# Patient Record
Sex: FEMALE | Race: WHITE | Employment: FULL TIME | URBAN - METROPOLITAN AREA
[De-identification: names, ages, dates, MRNs, and addresses within clinical notes are randomized per-mention and may not be internally consistent; named-entity substitution may affect disease eponyms.]

---

## 2023-10-23 ENCOUNTER — OFFICE VISIT (OUTPATIENT)
Dept: FAMILY MEDICINE CLINIC | Facility: CLINIC | Age: 48
End: 2023-10-23
Payer: COMMERCIAL

## 2023-10-23 VITALS
DIASTOLIC BLOOD PRESSURE: 98 MMHG | BODY MASS INDEX: 50.51 KG/M2 | RESPIRATION RATE: 16 BRPM | SYSTOLIC BLOOD PRESSURE: 142 MMHG | OXYGEN SATURATION: 96 % | HEART RATE: 89 BPM | TEMPERATURE: 97 F | HEIGHT: 62 IN | WEIGHT: 274.5 LBS

## 2023-10-23 DIAGNOSIS — R73.03 PREDIABETES: ICD-10-CM

## 2023-10-23 DIAGNOSIS — E66.01 CLASS 3 SEVERE OBESITY DUE TO EXCESS CALORIES WITHOUT SERIOUS COMORBIDITY WITH BODY MASS INDEX (BMI) OF 50.0 TO 59.9 IN ADULT (HCC): ICD-10-CM

## 2023-10-23 DIAGNOSIS — J40 BRONCHITIS: Primary | ICD-10-CM

## 2023-10-23 DIAGNOSIS — M25.561 ACUTE PAIN OF RIGHT KNEE: ICD-10-CM

## 2023-10-23 DIAGNOSIS — I10 PRIMARY HYPERTENSION: ICD-10-CM

## 2023-10-23 PROCEDURE — 3077F SYST BP >= 140 MM HG: CPT | Performed by: STUDENT IN AN ORGANIZED HEALTH CARE EDUCATION/TRAINING PROGRAM

## 2023-10-23 PROCEDURE — 3080F DIAST BP >= 90 MM HG: CPT | Performed by: STUDENT IN AN ORGANIZED HEALTH CARE EDUCATION/TRAINING PROGRAM

## 2023-10-23 PROCEDURE — 99204 OFFICE O/P NEW MOD 45 MIN: CPT | Performed by: STUDENT IN AN ORGANIZED HEALTH CARE EDUCATION/TRAINING PROGRAM

## 2023-10-23 PROCEDURE — 3008F BODY MASS INDEX DOCD: CPT | Performed by: STUDENT IN AN ORGANIZED HEALTH CARE EDUCATION/TRAINING PROGRAM

## 2023-10-23 RX ORDER — PREDNISONE 20 MG/1
TABLET ORAL
COMMUNITY
Start: 2023-10-19

## 2023-10-23 RX ORDER — PHENTERMINE HYDROCHLORIDE 15 MG/1
15 CAPSULE ORAL EVERY MORNING
Qty: 30 CAPSULE | Refills: 0 | Status: SHIPPED | OUTPATIENT
Start: 2023-11-21 | End: 2023-12-21

## 2023-10-23 RX ORDER — BENZONATATE 200 MG/1
CAPSULE ORAL
COMMUNITY
Start: 2023-10-19

## 2023-10-23 RX ORDER — AMLODIPINE BESYLATE 5 MG/1
5 TABLET ORAL 2 TIMES DAILY
Qty: 180 TABLET | Refills: 1 | Status: SHIPPED | OUTPATIENT
Start: 2023-10-23

## 2023-10-23 RX ORDER — ALBUTEROL SULFATE 90 UG/1
2 AEROSOL, METERED RESPIRATORY (INHALATION)
COMMUNITY
Start: 2023-10-19

## 2023-10-23 RX ORDER — AMLODIPINE BESYLATE 5 MG/1
5 TABLET ORAL 2 TIMES DAILY
COMMUNITY
End: 2023-10-23

## 2023-10-23 RX ORDER — PHENTERMINE HYDROCHLORIDE 15 MG/1
15 CAPSULE ORAL EVERY MORNING
Qty: 30 CAPSULE | Refills: 0 | Status: SHIPPED | OUTPATIENT
Start: 2023-10-23 | End: 2023-11-22

## 2023-10-23 RX ORDER — DICLOFENAC SODIUM 75 MG/1
75 TABLET, DELAYED RELEASE ORAL 2 TIMES DAILY PRN
Qty: 30 TABLET | Refills: 0 | Status: SHIPPED | OUTPATIENT
Start: 2023-10-23 | End: 2023-10-28

## 2023-12-04 DIAGNOSIS — M25.561 ACUTE PAIN OF RIGHT KNEE: ICD-10-CM

## 2023-12-04 RX ORDER — DICLOFENAC SODIUM 75 MG/1
75 TABLET, DELAYED RELEASE ORAL 2 TIMES DAILY PRN
Qty: 30 TABLET | Refills: 0 | Status: SHIPPED | OUTPATIENT
Start: 2023-12-04

## 2023-12-10 LAB
ALBUMIN/GLOBULIN RATIO: 1.7 (CALC) (ref 1–2.5)
ALBUMIN: 4 G/DL (ref 3.6–5.1)
ALKALINE PHOSPHATASE: 96 U/L (ref 37–153)
ALT: 64 U/L (ref 6–29)
AST: 43 U/L (ref 10–35)
BILIRUBIN, TOTAL: 0.6 MG/DL (ref 0.2–1.2)
BUN: 13 MG/DL (ref 7–25)
CALCIUM: 9.1 MG/DL (ref 8.6–10.4)
CARBON DIOXIDE: 26 MMOL/L (ref 20–32)
CHLORIDE: 105 MMOL/L (ref 98–110)
CHOL/HDLC RATIO: 4.1 (CALC)
CHOLESTEROL, TOTAL: 198 MG/DL
CREATININE: 0.85 MG/DL (ref 0.5–1.03)
EGFR: 83 ML/MIN/1.73M2
GLOBULIN: 2.3 G/DL (CALC) (ref 1.9–3.7)
GLUCOSE: 126 MG/DL (ref 65–99)
HDL CHOLESTEROL: 48 MG/DL
HEMATOCRIT: 40 % (ref 35–45)
HEMOGLOBIN A1C: 5.9 % OF TOTAL HGB
HEMOGLOBIN: 13.3 G/DL (ref 11.7–15.5)
LDL-CHOLESTEROL: 127 MG/DL (CALC)
MCH: 29.8 PG (ref 27–33)
MCHC: 33.3 G/DL (ref 32–36)
MCV: 89.5 FL (ref 80–100)
MPV: 10.8 FL (ref 7.5–12.5)
NON-HDL CHOLESTEROL: 150 MG/DL (CALC)
PLATELET COUNT: 262 THOUSAND/UL (ref 140–400)
POTASSIUM: 3.8 MMOL/L (ref 3.5–5.3)
PROTEIN, TOTAL: 6.3 G/DL (ref 6.1–8.1)
RDW: 13.1 % (ref 11–15)
RED BLOOD CELL COUNT: 4.47 MILLION/UL (ref 3.8–5.1)
SODIUM: 141 MMOL/L (ref 135–146)
TRIGLYCERIDES: 120 MG/DL
TSH W/REFLEX TO FT4: 1.99 MIU/L
WHITE BLOOD CELL COUNT: 6.5 THOUSAND/UL (ref 3.8–10.8)

## 2023-12-19 ENCOUNTER — OFFICE VISIT (OUTPATIENT)
Dept: FAMILY MEDICINE CLINIC | Facility: CLINIC | Age: 48
End: 2023-12-19
Payer: COMMERCIAL

## 2023-12-19 ENCOUNTER — TELEPHONE (OUTPATIENT)
Dept: FAMILY MEDICINE CLINIC | Facility: CLINIC | Age: 48
End: 2023-12-19

## 2023-12-19 VITALS
BODY MASS INDEX: 48.95 KG/M2 | RESPIRATION RATE: 16 BRPM | WEIGHT: 266 LBS | DIASTOLIC BLOOD PRESSURE: 80 MMHG | SYSTOLIC BLOOD PRESSURE: 126 MMHG | TEMPERATURE: 97 F | HEART RATE: 76 BPM | HEIGHT: 62 IN

## 2023-12-19 DIAGNOSIS — E66.01 CLASS 3 SEVERE OBESITY DUE TO EXCESS CALORIES WITHOUT SERIOUS COMORBIDITY WITH BODY MASS INDEX (BMI) OF 50.0 TO 59.9 IN ADULT (HCC): Primary | ICD-10-CM

## 2023-12-19 DIAGNOSIS — I10 PRIMARY HYPERTENSION: ICD-10-CM

## 2023-12-19 DIAGNOSIS — Z12.31 SCREENING MAMMOGRAM, ENCOUNTER FOR: ICD-10-CM

## 2023-12-19 DIAGNOSIS — R73.03 PREDIABETES: ICD-10-CM

## 2023-12-19 DIAGNOSIS — Z28.21 INFLUENZA VACCINATION DECLINED BY PATIENT: ICD-10-CM

## 2023-12-19 DIAGNOSIS — E78.00 PURE HYPERCHOLESTEROLEMIA: ICD-10-CM

## 2023-12-19 PROCEDURE — 3074F SYST BP LT 130 MM HG: CPT | Performed by: STUDENT IN AN ORGANIZED HEALTH CARE EDUCATION/TRAINING PROGRAM

## 2023-12-19 PROCEDURE — 3008F BODY MASS INDEX DOCD: CPT | Performed by: STUDENT IN AN ORGANIZED HEALTH CARE EDUCATION/TRAINING PROGRAM

## 2023-12-19 PROCEDURE — 3079F DIAST BP 80-89 MM HG: CPT | Performed by: STUDENT IN AN ORGANIZED HEALTH CARE EDUCATION/TRAINING PROGRAM

## 2023-12-19 PROCEDURE — 99214 OFFICE O/P EST MOD 30 MIN: CPT | Performed by: STUDENT IN AN ORGANIZED HEALTH CARE EDUCATION/TRAINING PROGRAM

## 2023-12-19 RX ORDER — PHENTERMINE HYDROCHLORIDE 30 MG/1
30 CAPSULE ORAL EVERY MORNING
Qty: 30 CAPSULE | Refills: 0 | Status: SHIPPED | OUTPATIENT
Start: 2023-12-19 | End: 2024-01-18

## 2023-12-19 RX ORDER — PHENTERMINE HYDROCHLORIDE 30 MG/1
30 CAPSULE ORAL EVERY MORNING
Qty: 30 CAPSULE | Refills: 0 | Status: SHIPPED | OUTPATIENT
Start: 2024-01-16 | End: 2024-02-15

## 2023-12-19 RX ORDER — PHENTERMINE HYDROCHLORIDE 15 MG/1
30 CAPSULE ORAL EVERY MORNING
Qty: 60 CAPSULE | Refills: 0 | Status: SHIPPED | OUTPATIENT
Start: 2023-12-19

## 2023-12-19 NOTE — TELEPHONE ENCOUNTER
Received fax from [facility] stating Phentermine 30mg is on backorder. They are asking if Phentermine 15mg 2QD can be given.    -please advise.  -pended if ok to sent.

## 2024-01-12 DIAGNOSIS — M25.561 ACUTE PAIN OF RIGHT KNEE: ICD-10-CM

## 2024-01-15 RX ORDER — DICLOFENAC SODIUM 75 MG/1
75 TABLET, DELAYED RELEASE ORAL 2 TIMES DAILY PRN
Qty: 30 TABLET | Refills: 0 | Status: SHIPPED | OUTPATIENT
Start: 2024-01-15

## 2024-01-15 NOTE — TELEPHONE ENCOUNTER
Requesting Diclofenac 75mg  Last OV: 12/19/23  RTC: 2 months  Last Rx'd 12/4/23 #30 with 0 refills    Future Appointments   Date Time Provider Department Center   1/23/2024  8:00 AM [facility]   2/19/2024 10:00 AM Tomasz Ren MD [facility] [address]       Non-protocol med:  Rx pended and routed for approval/denial

## 2024-01-22 DIAGNOSIS — E66.01 CLASS 3 SEVERE OBESITY DUE TO EXCESS CALORIES WITHOUT SERIOUS COMORBIDITY WITH BODY MASS INDEX (BMI) OF 50.0 TO 59.9 IN ADULT (HCC): ICD-10-CM

## 2024-01-22 DIAGNOSIS — R73.03 PREDIABETES: ICD-10-CM

## 2024-01-23 ENCOUNTER — HOSPITAL ENCOUNTER (OUTPATIENT)
Dept: MAMMOGRAPHY | Age: 49
Discharge: HOME | End: 2024-01-23
Attending: STUDENT IN AN ORGANIZED HEALTH CARE EDUCATION/TRAINING PROGRAM
Payer: COMMERCIAL

## 2024-01-23 DIAGNOSIS — Z12.31 SCREENING MAMMOGRAM, ENCOUNTER FOR: ICD-10-CM

## 2024-01-23 PROCEDURE — 77063 BREAST TOMOSYNTHESIS BI: CPT | Performed by: STUDENT IN AN ORGANIZED HEALTH CARE EDUCATION/TRAINING PROGRAM

## 2024-01-23 PROCEDURE — 77067 SCR MAMMO BI INCL CAD: CPT | Performed by: STUDENT IN AN ORGANIZED HEALTH CARE EDUCATION/TRAINING PROGRAM

## 2024-01-23 RX ORDER — PHENTERMINE HYDROCHLORIDE 15 MG/1
30 CAPSULE ORAL EVERY MORNING
Qty: 60 CAPSULE | Refills: 0 | OUTPATIENT
Start: 2024-01-23

## 2024-02-01 DIAGNOSIS — E66.01 CLASS 3 SEVERE OBESITY DUE TO EXCESS CALORIES WITHOUT SERIOUS COMORBIDITY WITH BODY MASS INDEX (BMI) OF 50.0 TO 59.9 IN ADULT (HCC): ICD-10-CM

## 2024-02-01 DIAGNOSIS — R73.03 PREDIABETES: ICD-10-CM

## 2024-02-02 RX ORDER — PHENTERMINE HYDROCHLORIDE 15 MG/1
30 CAPSULE ORAL EVERY MORNING
Qty: 60 CAPSULE | Refills: 0 | OUTPATIENT
Start: 2024-02-02

## 2024-02-02 NOTE — TELEPHONE ENCOUNTER
Requesting Phentermine 15mg   Last OV: 12/19/23  RTC: 2-3 months  Last Rx'd 12/19/23 #60 with 0 refills    Future Appointments   Date Time Provider Department Center   2/19/2024 10:00 AM Tomasz Ren MD [facility] [address]       Non-protocol med:  Rx pended and routed for approval/denial

## 2024-02-04 DIAGNOSIS — E66.01 CLASS 3 SEVERE OBESITY DUE TO EXCESS CALORIES WITHOUT SERIOUS COMORBIDITY WITH BODY MASS INDEX (BMI) OF 50.0 TO 59.9 IN ADULT (HCC): ICD-10-CM

## 2024-02-04 DIAGNOSIS — R73.03 PREDIABETES: ICD-10-CM

## 2024-02-05 RX ORDER — PHENTERMINE HYDROCHLORIDE 15 MG/1
30 CAPSULE ORAL EVERY MORNING
Qty: 60 CAPSULE | Refills: 0 | Status: SHIPPED | OUTPATIENT
Start: 2024-02-05

## 2024-02-06 RX ORDER — PHENTERMINE HYDROCHLORIDE 15 MG/1
30 CAPSULE ORAL EVERY MORNING
Qty: 60 CAPSULE | Refills: 0 | OUTPATIENT
Start: 2024-02-06

## 2024-02-19 ENCOUNTER — OFFICE VISIT (OUTPATIENT)
Dept: FAMILY MEDICINE CLINIC | Facility: CLINIC | Age: 49
End: 2024-02-19
Payer: COMMERCIAL

## 2024-02-19 ENCOUNTER — TELEPHONE (OUTPATIENT)
Dept: FAMILY MEDICINE CLINIC | Facility: CLINIC | Age: 49
End: 2024-02-19

## 2024-02-19 VITALS
HEIGHT: 62 IN | WEIGHT: 259.5 LBS | RESPIRATION RATE: 16 BRPM | BODY MASS INDEX: 47.75 KG/M2 | SYSTOLIC BLOOD PRESSURE: 128 MMHG | DIASTOLIC BLOOD PRESSURE: 78 MMHG | HEART RATE: 86 BPM | TEMPERATURE: 97 F | OXYGEN SATURATION: 98 %

## 2024-02-19 DIAGNOSIS — R73.03 PREDIABETES: ICD-10-CM

## 2024-02-19 DIAGNOSIS — Z13.31 NEGATIVE DEPRESSION SCREENING: ICD-10-CM

## 2024-02-19 DIAGNOSIS — E78.00 PURE HYPERCHOLESTEROLEMIA: ICD-10-CM

## 2024-02-19 DIAGNOSIS — E66.01 CLASS 3 SEVERE OBESITY DUE TO EXCESS CALORIES WITHOUT SERIOUS COMORBIDITY WITH BODY MASS INDEX (BMI) OF 50.0 TO 59.9 IN ADULT (HCC): ICD-10-CM

## 2024-02-19 DIAGNOSIS — D12.6 ADENOMATOUS POLYP OF COLON, UNSPECIFIED PART OF COLON: ICD-10-CM

## 2024-02-19 DIAGNOSIS — Z00.00 WELL ADULT EXAM: Primary | ICD-10-CM

## 2024-02-19 DIAGNOSIS — Z23 NEED FOR SHINGLES VACCINE: ICD-10-CM

## 2024-02-19 DIAGNOSIS — Z23 NEED FOR DIPHTHERIA-TETANUS-PERTUSSIS (TDAP) VACCINE: ICD-10-CM

## 2024-02-19 RX ORDER — PHENTERMINE HYDROCHLORIDE 37.5 MG/1
37.5 TABLET ORAL
Qty: 30 TABLET | Refills: 0 | Status: SHIPPED | OUTPATIENT
Start: 2024-05-03 | End: 2024-06-02

## 2024-02-19 RX ORDER — PHENTERMINE HYDROCHLORIDE 37.5 MG/1
37.5 TABLET ORAL
Qty: 30 TABLET | Refills: 0 | Status: SHIPPED | OUTPATIENT
Start: 2024-04-03 | End: 2024-05-03

## 2024-02-19 RX ORDER — PHENTERMINE HYDROCHLORIDE 37.5 MG/1
37.5 TABLET ORAL
Qty: 30 TABLET | Refills: 0 | Status: SHIPPED | OUTPATIENT
Start: 2024-03-04 | End: 2024-04-03

## 2024-02-19 NOTE — PROGRESS NOTES
Subjective:      Chief Complaint   Patient presents with    Weight Check     Taking phentermine 15mg     Physical     Has c-scope almost 2 years ago with Dr. Jang     HISTORY OF PRESENT ILLNESS  HPI  HPI obtained per patient report.  Johanny Lin is a pleasant 48 year old female presenting for her annual physical and for follow-up for her phentermine medication.   She has been tolerating phentermine 30mg well without any symptoms of side effects. She is down 7lbs since her LOV, but feels that she may be experiencing a plateau in her weight loss.    PAST PATIENT HISTORY  Past Medical History:   Diagnosis Date    Esophageal reflux     HTN (hypertension)     Obesity     Prediabetes      Past Surgical History:   Procedure Laterality Date          CHOLECYSTECTOMY  2022    COLONOSCOPY  2022      01       CURRENT MEDICATIONS  Outpatient Medications Marked as Taking for the 24 encounter (Office Visit) with Tomasz Ren MD   Medication Sig Dispense Refill    [START ON 5/3/2024] Phentermine HCl 37.5 MG Oral Tab Take 1 tablet (37.5 mg total) by mouth every morning before breakfast. 30 tablet 0    [START ON 4/3/2024] Phentermine HCl 37.5 MG Oral Tab Take 1 tablet (37.5 mg total) by mouth every morning before breakfast. 30 tablet 0    [START ON 3/4/2024] Phentermine HCl 37.5 MG Oral Tab Take 1 tablet (37.5 mg total) by mouth every morning before breakfast. 30 tablet 0    DICLOFENAC 75 MG Oral Tab EC TAKE ONE TABLET BY MOUTH TWICE DAILY AS NEEDED 30 tablet 0    amLODIPine 5 MG Oral Tab Take 1 tablet (5 mg total) by mouth 2 (two) times daily. 180 tablet 1    Omeprazole 40 MG Oral Capsule Delayed Release          HEALTH MAINTENANCE  Immunization History   Administered Date(s) Administered    >=3 YRS TRI  MULTIDOSE VIAL (87390) FLU CLINIC 2014    Covid-19 Vaccine Pfizer 30 mcg/0.3 ml 2021, 2021    FLU VAC QIV SPLIT 3 YRS AND OLDER (46063) 2016    Influenza  10/14/2011   Pended Date(s) Pended    TDAP 02/19/2024    Zoster Vaccine Recombinant Adjuvanted (Shingrix) 02/19/2024   Deferred Date(s) Deferred    FLUZONE 6 months and older PFS 0.5 ml (01724) 12/19/2023       ALLERGIES AND DRUG REACTIONS  Allergies   Allergen Reactions    Adhesive Tape     Neosporin [Neomycin-Bacitracin-Polymyxin]        Family History   Problem Relation Age of Onset    Breast Cancer Maternal Grandmother 60        DX in her 60's     Cancer Maternal Grandmother     Dementia Maternal Grandmother     Cancer Paternal Grandmother         Lung and bone    Breast Cancer Other         age at dx:  unknown    Breast Cancer Other         age at dx:   unknown    Depression Mother     Diabetes Mother     Hypertension Mother     Cancer Father     Hypertension Father     Obesity Father     Depression Son      Social History     Socioeconomic History    Marital status:    Tobacco Use    Smoking status: Never    Smokeless tobacco: Never   Substance and Sexual Activity    Alcohol use: No    Drug use: No   Other Topics Concern    Caffeine Concern Yes    Exercise No    Seat Belt Yes    Special Diet No    Stress Concern Yes    Weight Concern Yes       Review of Systems   All other systems reviewed and are negative.         Objective:      /78   Pulse 86   Temp 97.2 °F (36.2 °C) (Temporal)   Resp 16   Ht 5' 2\" (1.575 m)   Wt 259 lb 8 oz (117.7 kg)   SpO2 98%   BMI 47.46 kg/m²   Body mass index is 47.46 kg/m².    Physical Exam  Vitals reviewed.   Constitutional:       General: She is not in acute distress.     Appearance: She is not ill-appearing, toxic-appearing or diaphoretic.   HENT:      Head: Normocephalic and atraumatic.   Eyes:      General: No scleral icterus.        Right eye: No discharge.         Left eye: No discharge.      Extraocular Movements: Extraocular movements intact.      Conjunctiva/sclera: Conjunctivae normal.      Pupils: Pupils are equal, round, and reactive to light.    Cardiovascular:      Rate and Rhythm: Normal rate and regular rhythm.      Heart sounds: Normal heart sounds.   Pulmonary:      Effort: Pulmonary effort is normal.      Breath sounds: Normal breath sounds.   Abdominal:      General: Bowel sounds are normal. There is no distension.      Palpations: Abdomen is soft. There is no mass.      Tenderness: There is no abdominal tenderness. There is no guarding or rebound.   Musculoskeletal:      Cervical back: Neck supple.      Right lower leg: No edema.      Left lower leg: No edema.   Neurological:      Mental Status: She is alert and oriented to person, place, and time.            Assessment and Plan:      1. Well adult exam (Primary)  2. Negative depression screening  3. Adenomatous polyp of colon, unspecified part of colon  4. Need for diphtheria-tetanus-pertussis (Tdap) vaccine  -     TdaP (Boostrix) Vaccine (> 7 Y)  5. Need for shingles vaccine  -     Zoster Vac (Shingrix) in office vaccine- Non-Medicare or Medicare with ABN  6. Class 3 severe obesity due to excess calories without serious comorbidity with body mass index (BMI) of 50.0 to 59.9 in adult (HCC)  -     Phentermine HCl; Take 1 tablet (37.5 mg total) by mouth every morning before breakfast.  Dispense: 30 tablet; Refill: 0  -     Phentermine HCl; Take 1 tablet (37.5 mg total) by mouth every morning before breakfast.  Dispense: 30 tablet; Refill: 0  -     Phentermine HCl; Take 1 tablet (37.5 mg total) by mouth every morning before breakfast.  Dispense: 30 tablet; Refill: 0  7. Body mass index 50.0-59.9, adult (HCC)  -     Phentermine HCl; Take 1 tablet (37.5 mg total) by mouth every morning before breakfast.  Dispense: 30 tablet; Refill: 0  -     Phentermine HCl; Take 1 tablet (37.5 mg total) by mouth every morning before breakfast.  Dispense: 30 tablet; Refill: 0  -     Phentermine HCl; Take 1 tablet (37.5 mg total) by mouth every morning before breakfast.  Dispense: 30 tablet; Refill: 0  8.  Prediabetes  -     Phentermine HCl; Take 1 tablet (37.5 mg total) by mouth every morning before breakfast.  Dispense: 30 tablet; Refill: 0  -     Phentermine HCl; Take 1 tablet (37.5 mg total) by mouth every morning before breakfast.  Dispense: 30 tablet; Refill: 0  -     Phentermine HCl; Take 1 tablet (37.5 mg total) by mouth every morning before breakfast.  Dispense: 30 tablet; Refill: 0  9. Pure hypercholesterolemia  -     Phentermine HCl; Take 1 tablet (37.5 mg total) by mouth every morning before breakfast.  Dispense: 30 tablet; Refill: 0  -     Phentermine HCl; Take 1 tablet (37.5 mg total) by mouth every morning before breakfast.  Dispense: 30 tablet; Refill: 0  -     Phentermine HCl; Take 1 tablet (37.5 mg total) by mouth every morning before breakfast.  Dispense: 30 tablet; Refill: 0  10. Primary hypertension    Return in about 3 months (around 5/19/2024) for follow-up.    Physical  - recent mammogram benign; next due 1/2024  - pap deferred today per pt's request. Will reconsider pap at next OV  - colonoscopy next due 4/2025. History of adenomatous polyps on colonoscopy 2 years ago  - we discussed the R/B/A of Tdap booster and shingrix; pt consents to administration of both today. Will plan on 2nd dose of shingrix at next follow-up in about 3 months    BMI>50, prediabetes, hypercholesterolemia  - tolerating phentermine 30mg well  - a shared decision was made to try phentermine 37.5mg. We discussed the R/B/A of this dose adjustment  - we will plan to follow-up in 3-4 months or earlier if needed  - we will plan to recheck her CMP, A1C, and lipid panel in 4 months     Patient verbalized understanding of assessment and recommendations. All questions and concerns were addressed.    Electronically signed by Tomasz Ren MD

## 2024-02-19 NOTE — TELEPHONE ENCOUNTER
Received Colonoscopy report from Dr. Ezekiel Jang.  HM updated.  Report placed on MD bin for review.

## 2024-05-13 ENCOUNTER — OFFICE VISIT (OUTPATIENT)
Dept: FAMILY MEDICINE CLINIC | Facility: CLINIC | Age: 49
End: 2024-05-13
Payer: COMMERCIAL

## 2024-05-13 VITALS
BODY MASS INDEX: 44.76 KG/M2 | RESPIRATION RATE: 16 BRPM | HEIGHT: 62 IN | DIASTOLIC BLOOD PRESSURE: 84 MMHG | WEIGHT: 243.25 LBS | OXYGEN SATURATION: 97 % | HEART RATE: 85 BPM | SYSTOLIC BLOOD PRESSURE: 128 MMHG | TEMPERATURE: 97 F

## 2024-05-13 DIAGNOSIS — R73.03 PREDIABETES: ICD-10-CM

## 2024-05-13 DIAGNOSIS — N91.2 AMENORRHEA: ICD-10-CM

## 2024-05-13 DIAGNOSIS — Z12.4 CERVICAL CANCER SCREENING: ICD-10-CM

## 2024-05-13 DIAGNOSIS — E66.01 CLASS 3 SEVERE OBESITY DUE TO EXCESS CALORIES WITHOUT SERIOUS COMORBIDITY WITH BODY MASS INDEX (BMI) OF 50.0 TO 59.9 IN ADULT (HCC): Primary | ICD-10-CM

## 2024-05-13 DIAGNOSIS — E78.00 PURE HYPERCHOLESTEROLEMIA: ICD-10-CM

## 2024-05-13 DIAGNOSIS — Z23 NEED FOR VACCINATION: ICD-10-CM

## 2024-05-13 PROCEDURE — 87624 HPV HI-RISK TYP POOLED RSLT: CPT | Performed by: STUDENT IN AN ORGANIZED HEALTH CARE EDUCATION/TRAINING PROGRAM

## 2024-05-13 PROCEDURE — 88175 CYTOPATH C/V AUTO FLUID REDO: CPT | Performed by: STUDENT IN AN ORGANIZED HEALTH CARE EDUCATION/TRAINING PROGRAM

## 2024-05-13 PROCEDURE — 90750 HZV VACC RECOMBINANT IM: CPT | Performed by: STUDENT IN AN ORGANIZED HEALTH CARE EDUCATION/TRAINING PROGRAM

## 2024-05-13 PROCEDURE — 99214 OFFICE O/P EST MOD 30 MIN: CPT | Performed by: STUDENT IN AN ORGANIZED HEALTH CARE EDUCATION/TRAINING PROGRAM

## 2024-05-13 PROCEDURE — 90471 IMMUNIZATION ADMIN: CPT | Performed by: STUDENT IN AN ORGANIZED HEALTH CARE EDUCATION/TRAINING PROGRAM

## 2024-05-13 RX ORDER — PHENTERMINE HYDROCHLORIDE 37.5 MG/1
37.5 TABLET ORAL
Qty: 30 TABLET | Refills: 0 | Status: SHIPPED | OUTPATIENT
Start: 2024-05-13 | End: 2024-06-12

## 2024-05-13 RX ORDER — PHENTERMINE HYDROCHLORIDE 37.5 MG/1
37.5 TABLET ORAL
Qty: 30 TABLET | Refills: 0 | Status: SHIPPED | OUTPATIENT
Start: 2024-06-12 | End: 2024-07-12

## 2024-05-13 RX ORDER — PHENTERMINE HYDROCHLORIDE 37.5 MG/1
37.5 TABLET ORAL
Qty: 30 TABLET | Refills: 0 | Status: SHIPPED | OUTPATIENT
Start: 2024-07-12 | End: 2024-08-11

## 2024-05-13 NOTE — PROGRESS NOTES
Subjective:      Chief Complaint   Patient presents with    Weight Check     Taking phentermin 37.5mg    Pap    Immunization/Injection     Due for 2nd shingrix - pt agreed to vaccine today     HISTORY OF PRESENT ILLNESS  HPI  HPI obtained per patient report.  Johanny Lin is a pleasant 49 year old female presenting for follow-up and her pap.   She is also due for her second shingrix dose.     She has been feeling well with her current dose of phentermine. She joined a fitness club and has been exercising 3 times per week since March as well.     She notes that she had uterine bleeding in February and April of this year after over 1 year of amenorrhea. She did not have any hot flashes or other symptoms of menopause.     PAST PATIENT HISTORY  Past Medical History:    Esophageal reflux    HTN (hypertension)    Obesity    Prediabetes     Past Surgical History:   Procedure Laterality Date          Cholecystectomy  2022    Colonoscopy  2022      01       CURRENT MEDICATIONS  Outpatient Medications Marked as Taking for the 24 encounter (Office Visit) with Tomasz Ren MD   Medication Sig Dispense Refill    Phentermine HCl 37.5 MG Oral Tab Take 1 tablet (37.5 mg total) by mouth every morning before breakfast. 30 tablet 0    [START ON 2024] Phentermine HCl 37.5 MG Oral Tab Take 1 tablet (37.5 mg total) by mouth every morning before breakfast. 30 tablet 0    [START ON 2024] Phentermine HCl 37.5 MG Oral Tab Take 1 tablet (37.5 mg total) by mouth every morning before breakfast. 30 tablet 0    amLODIPine 5 MG Oral Tab Take 1 tablet (5 mg total) by mouth 2 (two) times daily. 180 tablet 1    Omeprazole 40 MG Oral Capsule Delayed Release          HEALTH MAINTENANCE  Immunization History   Administered Date(s) Administered    >=3 YRS TRI  MULTIDOSE VIAL (04378) FLU CLINIC 2014    Covid-19 Vaccine Pfizer 30 mcg/0.3 ml 2021, 2021    FLU VAC QIV SPLIT 3 YRS AND  OLDER (53737) 12/19/2016    Influenza 10/14/2011    TDAP 02/19/2024    Zoster Vaccine Recombinant Adjuvanted (Shingrix) 02/19/2024   Pended Date(s) Pended    Zoster Vaccine Recombinant Adjuvanted (Shingrix) 05/13/2024   Deferred Date(s) Deferred    FLUZONE 6 months and older PFS 0.5 ml (88181) 12/19/2023       ALLERGIES AND DRUG REACTIONS  Allergies   Allergen Reactions    Adhesive Tape     Neosporin [Neomycin-Bacitracin-Polymyxin]        Family History   Problem Relation Age of Onset    Breast Cancer Maternal Grandmother 60        DX in her 60's     Cancer Maternal Grandmother     Dementia Maternal Grandmother     Cancer Paternal Grandmother         Lung and bone    Breast Cancer Other         age at dx:  unknown    Breast Cancer Other         age at dx:   unknown    Depression Mother     Diabetes Mother     Hypertension Mother     Cancer Father     Hypertension Father     Obesity Father     Depression Son      Social History     Socioeconomic History    Marital status:    Tobacco Use    Smoking status: Never    Smokeless tobacco: Never   Substance and Sexual Activity    Alcohol use: No    Drug use: No   Other Topics Concern    Caffeine Concern Yes    Exercise No    Seat Belt Yes    Special Diet No    Stress Concern Yes    Weight Concern Yes       Review of Systems   Genitourinary:  Positive for vaginal bleeding.   All other systems reviewed and are negative.         Objective:      /84   Pulse 85   Temp 97.2 °F (36.2 °C) (Temporal)   Resp 16   Ht 5' 2\" (1.575 m)   Wt 243 lb 4 oz (110.3 kg)   SpO2 97%   BMI 44.49 kg/m²   Body mass index is 44.49 kg/m².    Physical Exam  Vitals reviewed.   Constitutional:       General: She is not in acute distress.     Appearance: She is not ill-appearing, toxic-appearing or diaphoretic.   HENT:      Head: Normocephalic and atraumatic.   Cardiovascular:      Rate and Rhythm: Normal rate.   Pulmonary:      Effort: Pulmonary effort is normal.   Abdominal:       General: There is no distension.   Genitourinary:     General: Normal vulva.      Vagina: Normal.      Cervix: Normal.   Musculoskeletal:      Cervical back: Neck supple.      Right lower leg: No edema.      Left lower leg: No edema.   Neurological:      Mental Status: She is alert and oriented to person, place, and time.   Psychiatric:         Mood and Affect: Mood normal.            Assessment and Plan:      1. Class 3 severe obesity due to excess calories without serious comorbidity with body mass index (BMI) of 50.0 to 59.9 in adult ([facility]) (Primary)  -     Comp Metabolic Panel (14)  -     Lipid Panel  -     Hemoglobin A1C  -     Phentermine HCl; Take 1 tablet (37.5 mg total) by mouth every morning before breakfast.  Dispense: 30 tablet; Refill: 0  -     Phentermine HCl; Take 1 tablet (37.5 mg total) by mouth every morning before breakfast.  Dispense: 30 tablet; Refill: 0  -     Phentermine HCl; Take 1 tablet (37.5 mg total) by mouth every morning before breakfast.  Dispense: 30 tablet; Refill: 0  2. Need for vaccination  -     Zoster Vac (Shingrix) in office vaccine- Non-Medicare or Medicare with ABN  3. Body mass index 50.0-59.9, adult ([facility])  -     Comp Metabolic Panel (14)  -     Lipid Panel  -     Hemoglobin A1C  -     Phentermine HCl; Take 1 tablet (37.5 mg total) by mouth every morning before breakfast.  Dispense: 30 tablet; Refill: 0  -     Phentermine HCl; Take 1 tablet (37.5 mg total) by mouth every morning before breakfast.  Dispense: 30 tablet; Refill: 0  -     Phentermine HCl; Take 1 tablet (37.5 mg total) by mouth every morning before breakfast.  Dispense: 30 tablet; Refill: 0  4. Prediabetes  -     Comp Metabolic Panel (14)  -     Lipid Panel  -     Hemoglobin A1C  -     Phentermine HCl; Take 1 tablet (37.5 mg total) by mouth every morning before breakfast.  Dispense: 30 tablet; Refill: 0  -     Phentermine HCl; Take 1 tablet (37.5 mg total) by mouth every morning before breakfast.  Dispense: 30  tablet; Refill: 0  -     Phentermine HCl; Take 1 tablet (37.5 mg total) by mouth every morning before breakfast.  Dispense: 30 tablet; Refill: 0  5. Pure hypercholesterolemia  -     Comp Metabolic Panel (14)  -     Lipid Panel  -     Hemoglobin A1C  -     Phentermine HCl; Take 1 tablet (37.5 mg total) by mouth every morning before breakfast.  Dispense: 30 tablet; Refill: 0  -     Phentermine HCl; Take 1 tablet (37.5 mg total) by mouth every morning before breakfast.  Dispense: 30 tablet; Refill: 0  -     Phentermine HCl; Take 1 tablet (37.5 mg total) by mouth every morning before breakfast.  Dispense: 30 tablet; Refill: 0  6. Cervical cancer screening  -     ThinPrep PAP Smear B; Future; Expected date: 05/13/2024  -     Hpv High Risk , Thin Prep Collect; Future; Expected date: 05/13/2024  7. Amenorrhea  -     FSH AND LH [7137] [Q]  -     Estradiol    Return in about 3 months (around 8/13/2024).    BMI>50, prediabetes, hypercholesterolemia  - tolerating phentermine 37.5 mg well  - she is down 16 lbs since her LOV about 3 months ago  - we will continue her phentermine regimen for another 3 months  - recommended rechecking her labs sometime within the next 3 months    Cervical cancer screening  - pap specimen sent for cotesting today with pt consent    Amenorrhea  - possible postmenopausal bleeding  - recommended checking hormone levels to evaluate menopausal status  - further recommendations pending these test results    Immunization  - 2nd dose of shingrix administered with her consent today    Patient verbalized understanding of assessment and recommendations. All questions and concerns were addressed.    Electronically signed by Tomasz Ren MD

## 2024-05-14 LAB — HPV I/H RISK 1 DNA SPEC QL NAA+PROBE: NEGATIVE

## 2024-05-19 DIAGNOSIS — M25.561 ACUTE PAIN OF RIGHT KNEE: ICD-10-CM

## 2024-05-20 LAB
.: NORMAL
.: NORMAL

## 2024-05-21 LAB
ALBUMIN/GLOBULIN RATIO: 1.8 (CALC) (ref 1–2.5)
ALBUMIN: 4.3 G/DL (ref 3.6–5.1)
ALKALINE PHOSPHATASE: 81 U/L (ref 37–153)
ALT: 55 U/L (ref 6–29)
AST: 40 U/L (ref 10–35)
BILIRUBIN, TOTAL: 0.7 MG/DL (ref 0.2–1.2)
BUN: 16 MG/DL (ref 7–25)
CALCIUM: 9.5 MG/DL (ref 8.6–10.4)
CARBON DIOXIDE: 31 MMOL/L (ref 20–32)
CHLORIDE: 104 MMOL/L (ref 98–110)
CHOL/HDLC RATIO: 3.4 (CALC)
CHOLESTEROL, TOTAL: 190 MG/DL
CREATININE: 0.82 MG/DL (ref 0.5–1.03)
EGFR: 87 ML/MIN/1.73M2
ESTRADIOL: 20 PG/ML
FSH: 71.9 MIU/ML
GLOBULIN: 2.4 G/DL (CALC) (ref 1.9–3.7)
GLUCOSE: 107 MG/DL (ref 65–99)
HDL CHOLESTEROL: 56 MG/DL
HEMOGLOBIN A1C: 5.7 % OF TOTAL HGB
LDL-CHOLESTEROL: 116 MG/DL (CALC)
LH: 34.9 MIU/ML
NON-HDL CHOLESTEROL: 134 MG/DL (CALC)
POTASSIUM: 3.8 MMOL/L (ref 3.5–5.3)
PROTEIN, TOTAL: 6.7 G/DL (ref 6.1–8.1)
SODIUM: 141 MMOL/L (ref 135–146)
TRIGLYCERIDES: 81 MG/DL

## 2024-05-21 RX ORDER — DICLOFENAC SODIUM 75 MG/1
75 TABLET, DELAYED RELEASE ORAL 2 TIMES DAILY PRN
Qty: 30 TABLET | Refills: 0 | OUTPATIENT
Start: 2024-05-21

## 2024-05-21 NOTE — TELEPHONE ENCOUNTER
Requested Renewals     Name from pharmacy: Diclofenac Sodium Dr 75 Mg Tab Risi         Will file in chart as: DICLOFENAC 75 MG Oral Tab EC    The original prescription was discontinued on 1/15/2024 by Tomasz Ren MD. Renewing this prescription may not be appropriate.    Sig: TAKE ONE TABLET BY MOUTH TWICE DAILY AS NEEDED    Disp: 30 tablet    Refills: 0    Start: 5/19/2024    Class: Normal    Non-formulary For: Acute pain of right knee        Future Appointments   Date Time Provider Department Center   8/15/2024  9:00 AM Tomasz Ren MD [facility] [address]     LOV: 5/13/24    This medication was discontinued.

## 2024-05-24 DIAGNOSIS — N95.0 POSTMENOPAUSAL BLEEDING: Primary | ICD-10-CM

## 2024-07-27 DIAGNOSIS — M25.561 ACUTE PAIN OF RIGHT KNEE: ICD-10-CM

## 2024-07-30 RX ORDER — DICLOFENAC SODIUM 75 MG/1
75 TABLET, DELAYED RELEASE ORAL 2 TIMES DAILY PRN
Qty: 30 TABLET | Refills: 0 | OUTPATIENT
Start: 2024-07-30

## 2024-07-30 NOTE — TELEPHONE ENCOUNTER
Requested Renewals     Name from pharmacy: Diclofenac Sodium Dr 75 Mg Tab Risi         Will file in chart as: DICLOFENAC 75 MG Oral Tab EC    The original prescription was discontinued on 5/13/2024 by Lidia Priest MA for the following reason: Patient discontinued. Renewing this prescription may not be appropriate.    Sig: TAKE ONE TABLET BY MOUTH TWICE DAILY AS NEEDED    Disp: 30 tablet    Refills: 0    Start: 7/27/2024    Class: Normal    Non-formulary For: Acute pain of right knee    Last ordered: 6 months ago (1/15/2024) by Tomasz Ren MD    Last refill: 1/15/2024    Rx #: [id number]    Non-Narcotic Pain Medication Protocol [id number]/27/2024 11:03 AM   Protocol Details In person appointment or virtual visit in the past 6 mos or appointment in next 3 mos             Future Appointments   Date Time Provider Department Center   8/15/2024  9:00 AM Tomasz Ren MD [facility] [address]     LOV: 5/13/24    This medication was discontinued.

## 2024-09-06 DIAGNOSIS — E78.00 PURE HYPERCHOLESTEROLEMIA: ICD-10-CM

## 2024-09-06 DIAGNOSIS — R73.03 PREDIABETES: ICD-10-CM

## 2024-09-06 DIAGNOSIS — E66.01 CLASS 3 SEVERE OBESITY DUE TO EXCESS CALORIES WITHOUT SERIOUS COMORBIDITY WITH BODY MASS INDEX (BMI) OF 50.0 TO 59.9 IN ADULT (HCC): ICD-10-CM

## 2024-09-06 DIAGNOSIS — I10 PRIMARY HYPERTENSION: ICD-10-CM

## 2024-09-11 RX ORDER — AMLODIPINE BESYLATE 5 MG/1
5 TABLET ORAL 2 TIMES DAILY
Qty: 180 TABLET | Refills: 0 | Status: SHIPPED | OUTPATIENT
Start: 2024-09-11

## 2024-09-11 NOTE — TELEPHONE ENCOUNTER
Requesting Phentermine 37.5mg  Last Rx'd 7/12/24 #30 with 0 refills    Requesting Amlodipine 5mg BID  Last Rx'd 10/23/23 #180 with 2 refills  Hypertension Medications Protocol [id number]/06/2024 11:24 AM   Protocol Details CMP or BMP in past 12 months    Last BP reading less than 140/90    In person appointment or virtual visit in the past 12 mos or appointment in next 3 mos    EGFRCR or GFRNAA > 50       Last OV: 5/13/24  RTC: 3 months  Last labs: 5/20/24    Future Appointments   Date Time Provider Department Center   9/21/2024  9:30 AM Tomasz Ren MD [facility] [address]       Per [place] , last dispensed 8/8/24 #30    Controlled med:  Rx pended and routed for approval/denial

## 2024-09-12 RX ORDER — PHENTERMINE HYDROCHLORIDE 37.5 MG/1
37.5 TABLET ORAL
Qty: 30 TABLET | Refills: 0 | Status: SHIPPED | OUTPATIENT
Start: 2024-09-12

## 2024-09-21 ENCOUNTER — OFFICE VISIT (OUTPATIENT)
Dept: FAMILY MEDICINE CLINIC | Facility: CLINIC | Age: 49
End: 2024-09-21
Payer: COMMERCIAL

## 2024-09-21 VITALS
HEIGHT: 62 IN | TEMPERATURE: 97 F | RESPIRATION RATE: 16 BRPM | OXYGEN SATURATION: 98 % | SYSTOLIC BLOOD PRESSURE: 128 MMHG | HEART RATE: 87 BPM | WEIGHT: 241.25 LBS | DIASTOLIC BLOOD PRESSURE: 82 MMHG | BODY MASS INDEX: 44.4 KG/M2

## 2024-09-21 DIAGNOSIS — E66.01 CLASS 3 SEVERE OBESITY DUE TO EXCESS CALORIES WITHOUT SERIOUS COMORBIDITY WITH BODY MASS INDEX (BMI) OF 50.0 TO 59.9 IN ADULT (HCC): Primary | ICD-10-CM

## 2024-09-21 DIAGNOSIS — R73.03 PREDIABETES: ICD-10-CM

## 2024-09-21 DIAGNOSIS — M25.561 ACUTE PAIN OF RIGHT KNEE: ICD-10-CM

## 2024-09-21 DIAGNOSIS — E78.00 PURE HYPERCHOLESTEROLEMIA: ICD-10-CM

## 2024-09-21 DIAGNOSIS — N95.0 POSTMENOPAUSAL BLEEDING: ICD-10-CM

## 2024-09-21 PROCEDURE — 99214 OFFICE O/P EST MOD 30 MIN: CPT | Performed by: STUDENT IN AN ORGANIZED HEALTH CARE EDUCATION/TRAINING PROGRAM

## 2024-09-21 RX ORDER — PHENTERMINE HYDROCHLORIDE 37.5 MG/1
37.5 CAPSULE ORAL EVERY MORNING
Qty: 30 CAPSULE | Refills: 0 | Status: SHIPPED | OUTPATIENT
Start: 2024-11-17

## 2024-09-21 RX ORDER — DICLOFENAC SODIUM 75 MG/1
75 TABLET, DELAYED RELEASE ORAL 2 TIMES DAILY PRN
Qty: 180 TABLET | Refills: 1 | Status: SHIPPED | OUTPATIENT
Start: 2024-09-21

## 2024-09-21 RX ORDER — PHENTERMINE HYDROCHLORIDE 37.5 MG/1
37.5 CAPSULE ORAL EVERY MORNING
Qty: 30 CAPSULE | Refills: 0 | Status: SHIPPED | OUTPATIENT
Start: 2024-10-19

## 2024-09-21 RX ORDER — PHENTERMINE HYDROCHLORIDE 37.5 MG/1
37.5 CAPSULE ORAL EVERY MORNING
Qty: 30 CAPSULE | Refills: 0 | Status: SHIPPED | OUTPATIENT
Start: 2024-09-21

## 2024-09-21 NOTE — PROGRESS NOTES
Subjective:      Chief Complaint   Patient presents with    Weight Check     Taking phentermine 37.5mg      Medication Request     Needs refill on Diclofenac due to hip pain     HISTORY OF PRESENT ILLNESS  HPI  HPI obtained per patient report.  Johanny Lin is a pleasant 49 year old female presenting    PAST PATIENT HISTORY  Past Medical History:    Esophageal reflux    HTN (hypertension)    Obesity    Prediabetes     Past Surgical History:   Procedure Laterality Date          Cholecystectomy  2022    Colonoscopy  2022      01       CURRENT MEDICATIONS  Outpatient Medications Marked as Taking for the 24 encounter (Office Visit) with Tomasz Ren MD   Medication Sig Dispense Refill    PHENTERMINE HCL 37.5 MG Oral Tab Take 1 tablet by mouth every morning before breakfast. 30 tablet 0    AMLODIPINE 5 MG Oral Tab TAKE ONE TABLET BY MOUTH TWICE DAILY 180 tablet 0    Omeprazole 40 MG Oral Capsule Delayed Release          HEALTH MAINTENANCE  Immunization History   Administered Date(s) Administered    >=3 YRS TRI  MULTIDOSE VIAL (43539) FLU CLINIC 2014    Covid-19 Vaccine Pfizer 30 mcg/0.3 ml 2021, 2021    FLU VAC QIV SPLIT 3 YRS AND OLDER (15325) 2016    Influenza 10/14/2011    TDAP 2024    Zoster Vaccine Recombinant Adjuvanted (Shingrix) 2024, 2024   Deferred Date(s) Deferred    FLUZONE 6 months and older PFS 0.5 ml (76187) 2023       ALLERGIES AND DRUG REACTIONS  Allergies   Allergen Reactions    Adhesive Tape     Neosporin [Neomycin-Bacitracin-Polymyxin]        Family History   Problem Relation Age of Onset    Breast Cancer Maternal Grandmother 60        DX in her 60's     Cancer Maternal Grandmother     Dementia Maternal Grandmother     Cancer Paternal Grandmother         Lung and bone    Breast Cancer Other         age at dx:  unknown    Breast Cancer Other         age at dx:   unknown    Depression Mother     Diabetes Mother      Hypertension Mother     Cancer Father     Hypertension Father     Obesity Father     Depression Son      Social History     Socioeconomic History    Marital status:    Tobacco Use    Smoking status: Never    Smokeless tobacco: Never   Substance and Sexual Activity    Alcohol use: No    Drug use: No   Other Topics Concern    Caffeine Concern Yes    Exercise No    Seat Belt Yes    Special Diet No    Stress Concern Yes    Weight Concern Yes       Review of Systems       Objective:      /82   Pulse 87   Temp 97.2 °F (36.2 °C) (Temporal)   Resp 16   Ht 5' 2\" (1.575 m)   Wt 241 lb 4 oz (109.4 kg)   SpO2 98%   BMI 44.13 kg/m²   Body mass index is 44.13 kg/m².    Physical Exam       Assessment and Plan:      1. Class 3 severe obesity due to excess calories without serious comorbidity with body mass index (BMI) of 50.0 to 59.9 in adult (HCC) (Primary)  2. Body mass index 50.0-59.9, adult (HCC)  3. Prediabetes  4. Pure hypercholesterolemia  5. Acute pain of right knee  6. Postmenopausal bleeding    No follow-ups on file.        Patient verbalized understanding of assessment and recommendations. All questions and concerns were addressed.    Electronically signed by Tomasz Ren MD

## 2024-09-21 NOTE — PROGRESS NOTES
Subjective:      Chief Complaint   Patient presents with    Weight Check     Taking phentermine 37.5mg      Medication Request     Needs refill on Diclofenac due to hip pain     HISTORY OF PRESENT ILLNESS  HPI  HPI obtained per patient report.  Johanny Lin is a pleasant 49 year old female presenting for follow-up for weight management.   She has continued tolerating phentermine well without any issues. It continues to help with reducing her appetite and cravings. She is down 2 lbs total since her LOV; she had lost more weight initially but noticed weight regain after discontinuing her [employer] exercise program in July due to the cost of the program.     PAST PATIENT HISTORY  Past Medical History:    Esophageal reflux    HTN (hypertension)    Obesity    Prediabetes     Past Surgical History:   Procedure Laterality Date          Cholecystectomy  2022    Colonoscopy  2022      01       CURRENT MEDICATIONS  Outpatient Medications Marked as Taking for the 24 encounter (Office Visit) with Tomasz Ren MD   Medication Sig Dispense Refill    PHENTERMINE HCL 37.5 MG Oral Tab Take 1 tablet by mouth every morning before breakfast. 30 tablet 0    AMLODIPINE 5 MG Oral Tab TAKE ONE TABLET BY MOUTH TWICE DAILY 180 tablet 0    Omeprazole 40 MG Oral Capsule Delayed Release          HEALTH MAINTENANCE  Immunization History   Administered Date(s) Administered    >=3 YRS TRI  MULTIDOSE VIAL (39514) FLU CLINIC 2014    Covid-19 Vaccine Pfizer 30 mcg/0.3 ml 2021, 2021    FLU VAC QIV SPLIT 3 YRS AND OLDER (27079) 2016    Influenza 10/14/2011    TDAP 2024    Zoster Vaccine Recombinant Adjuvanted (Shingrix) 2024, 2024   Deferred Date(s) Deferred    FLUZONE 6 months and older PFS 0.5 ml (59609) 2023       ALLERGIES AND DRUG REACTIONS  Allergies   Allergen Reactions    Adhesive Tape     Neosporin [Neomycin-Bacitracin-Polymyxin]        Family History    Problem Relation Age of Onset    Breast Cancer Maternal Grandmother 60        DX in her 60's     Cancer Maternal Grandmother     Dementia Maternal Grandmother     Cancer Paternal Grandmother         Lung and bone    Breast Cancer Other         age at dx:  unknown    Breast Cancer Other         age at dx:   unknown    Depression Mother     Diabetes Mother     Hypertension Mother     Cancer Father     Hypertension Father     Obesity Father     Depression Son      Social History     Socioeconomic History    Marital status:    Tobacco Use    Smoking status: Never    Smokeless tobacco: Never   Substance and Sexual Activity    Alcohol use: No    Drug use: No   Other Topics Concern    Caffeine Concern Yes    Exercise No    Seat Belt Yes    Special Diet No    Stress Concern Yes    Weight Concern Yes       Review of Systems   All other systems reviewed and are negative.         Objective:      /82   Pulse 87   Temp 97.2 °F (36.2 °C) (Temporal)   Resp 16   Ht 5' 2\" (1.575 m)   Wt 241 lb 4 oz (109.4 kg)   SpO2 98%   BMI 44.13 kg/m²   Body mass index is 44.13 kg/m².    Physical Exam  Vitals reviewed.   Constitutional:       General: She is not in acute distress.     Appearance: She is not ill-appearing, toxic-appearing or diaphoretic.   Eyes:      General: No scleral icterus.        Right eye: No discharge.         Left eye: No discharge.      Extraocular Movements: Extraocular movements intact.      Conjunctiva/sclera: Conjunctivae normal.   Cardiovascular:      Rate and Rhythm: Normal rate.   Pulmonary:      Effort: Pulmonary effort is normal.   Abdominal:      General: There is no distension.   Musculoskeletal:      Cervical back: Neck supple.      Right lower leg: No edema.      Left lower leg: No edema.   Neurological:      Mental Status: She is alert and oriented to person, place, and time.   Psychiatric:         Mood and Affect: Mood normal.            Assessment and Plan:      1. Class 3 severe  obesity due to excess calories without serious comorbidity with body mass index (BMI) of 50.0 to 59.9 in adult (HCC) (Primary)  2. Body mass index 50.0-59.9, adult (HCC)  3. Prediabetes  4. Pure hypercholesterolemia  5. Acute pain of right knee  6. Postmenopausal bleeding    No follow-ups on file.    BMI>50  - she is doing well with her current phentermine regimen. Her prescriptions were renewed for another 3 months  - she plans on looking for more affordable exercise programs and gym memberships in the near future    Postmenopausal bleeding  - she has been following up with her Gyne  - she denies any further episodes of bleeding since her cervical polyps were removed in August, which is reassuring  - she had a pelvic US to assess her endometrium on 9/8/24 and states that she was recommended a follow-up pelvic US after 6 months  - recommended continuation of follow-up with her Gyne as recommended by her    Patient verbalized understanding of assessment and recommendations. All questions and concerns were addressed.    Electronically signed by Tomasz Ren MD

## 2024-10-11 DIAGNOSIS — E66.01 CLASS 3 SEVERE OBESITY DUE TO EXCESS CALORIES WITHOUT SERIOUS COMORBIDITY WITH BODY MASS INDEX (BMI) OF 50.0 TO 59.9 IN ADULT (HCC): ICD-10-CM

## 2024-10-11 DIAGNOSIS — R73.03 PREDIABETES: ICD-10-CM

## 2024-10-11 DIAGNOSIS — E66.813 CLASS 3 SEVERE OBESITY DUE TO EXCESS CALORIES WITHOUT SERIOUS COMORBIDITY WITH BODY MASS INDEX (BMI) OF 50.0 TO 59.9 IN ADULT (HCC): ICD-10-CM

## 2024-10-11 DIAGNOSIS — E78.00 PURE HYPERCHOLESTEROLEMIA: ICD-10-CM

## 2024-10-11 RX ORDER — PHENTERMINE HYDROCHLORIDE 37.5 MG/1
37.5 TABLET ORAL
Qty: 30 TABLET | Refills: 0 | OUTPATIENT
Start: 2024-10-11

## 2024-10-11 NOTE — TELEPHONE ENCOUNTER
3 one month prescriptions were sent on 9/21/24    Patient to follow up in December. Refills at pharmacy until then.

## 2024-11-01 ENCOUNTER — MED REC SCAN ONLY (OUTPATIENT)
Dept: FAMILY MEDICINE CLINIC | Facility: CLINIC | Age: 49
End: 2024-11-01

## 2024-11-13 DIAGNOSIS — R73.03 PREDIABETES: ICD-10-CM

## 2024-11-13 DIAGNOSIS — E78.00 PURE HYPERCHOLESTEROLEMIA: ICD-10-CM

## 2024-11-13 DIAGNOSIS — E66.01 CLASS 3 SEVERE OBESITY DUE TO EXCESS CALORIES WITHOUT SERIOUS COMORBIDITY WITH BODY MASS INDEX (BMI) OF 50.0 TO 59.9 IN ADULT (HCC): ICD-10-CM

## 2024-11-13 DIAGNOSIS — E66.813 CLASS 3 SEVERE OBESITY DUE TO EXCESS CALORIES WITHOUT SERIOUS COMORBIDITY WITH BODY MASS INDEX (BMI) OF 50.0 TO 59.9 IN ADULT (HCC): ICD-10-CM

## 2024-11-19 RX ORDER — PHENTERMINE HYDROCHLORIDE 37.5 MG/1
37.5 TABLET ORAL
Qty: 30 TABLET | Refills: 0 | OUTPATIENT
Start: 2024-11-19

## 2025-01-01 ENCOUNTER — MED REC SCAN ONLY (OUTPATIENT)
Dept: FAMILY MEDICINE CLINIC | Facility: CLINIC | Age: 50
End: 2025-01-01

## 2025-01-02 ENCOUNTER — TELEMEDICINE (OUTPATIENT)
Dept: FAMILY MEDICINE CLINIC | Facility: CLINIC | Age: 50
End: 2025-01-02
Payer: COMMERCIAL

## 2025-01-02 ENCOUNTER — TELEPHONE (OUTPATIENT)
Dept: FAMILY MEDICINE CLINIC | Facility: CLINIC | Age: 50
End: 2025-01-02

## 2025-01-02 DIAGNOSIS — E78.00 PURE HYPERCHOLESTEROLEMIA: ICD-10-CM

## 2025-01-02 DIAGNOSIS — E66.01 CLASS 3 SEVERE OBESITY DUE TO EXCESS CALORIES WITHOUT SERIOUS COMORBIDITY WITH BODY MASS INDEX (BMI) OF 50.0 TO 59.9 IN ADULT (HCC): Primary | ICD-10-CM

## 2025-01-02 DIAGNOSIS — E66.813 CLASS 3 SEVERE OBESITY DUE TO EXCESS CALORIES WITHOUT SERIOUS COMORBIDITY WITH BODY MASS INDEX (BMI) OF 50.0 TO 59.9 IN ADULT (HCC): Primary | ICD-10-CM

## 2025-01-02 DIAGNOSIS — R73.03 PREDIABETES: ICD-10-CM

## 2025-01-02 DIAGNOSIS — M25.552 LEFT HIP PAIN: ICD-10-CM

## 2025-01-02 PROCEDURE — 98006 SYNCH AUDIO-VIDEO EST MOD 30: CPT | Performed by: STUDENT IN AN ORGANIZED HEALTH CARE EDUCATION/TRAINING PROGRAM

## 2025-01-02 RX ORDER — TIRZEPATIDE 7.5 MG/.5ML
7.5 INJECTION, SOLUTION SUBCUTANEOUS WEEKLY
Qty: 2 ML | Refills: 0 | Status: SHIPPED | OUTPATIENT
Start: 2025-01-02 | End: 2025-01-24

## 2025-01-02 RX ORDER — TIRZEPATIDE 5 MG/.5ML
5 INJECTION, SOLUTION SUBCUTANEOUS WEEKLY
Qty: 2 ML | Refills: 0 | Status: SHIPPED | OUTPATIENT
Start: 2025-01-02 | End: 2025-01-24

## 2025-01-02 RX ORDER — TIRZEPATIDE 2.5 MG/.5ML
2.5 INJECTION, SOLUTION SUBCUTANEOUS WEEKLY
Qty: 2 ML | Refills: 0 | Status: SHIPPED | OUTPATIENT
Start: 2025-01-02 | End: 2025-01-24

## 2025-01-02 NOTE — TELEPHONE ENCOUNTER
Patient calling stating that a Prior Authorization is needed for Tirzepatide-Weight Management (ZEPBOUND) 2.5 MG/0.5ML Subcutaneous Solution Auto-injector.     Please advise.

## 2025-01-02 NOTE — PROGRESS NOTES
Subjective:      No chief complaint on file.    HISTORY OF PRESENT ILLNESS  HPI  HPI obtained per patient report.  Johanny Lin is a pleasant 49 year old female presenting virtually for follow-up for weight management.   She notes that her weight has increased by a few lbs since her LOV. Her physical activity level is limited by L hip pain. Her pain is located at the medial aspect of her hip and is exacerbated by sitting or walking for extended periods of time. She is taking diclofenac but this does not adequately manage her pain. Her pain began several years ago.       PAST PATIENT HISTORY  Past Medical History:    Esophageal reflux    HTN (hypertension)    Obesity    Prediabetes     Past Surgical History:   Procedure Laterality Date          Cholecystectomy  2022    Colonoscopy  2022      01       CURRENT MEDICATIONS  Medications Taking[1]    HEALTH MAINTENANCE  Immunization History   Administered Date(s) Administered    >=3 YRS TRI  MULTIDOSE VIAL (61294) FLU CLINIC 2014    Covid-19 Vaccine Pfizer 30 mcg/0.3 ml 2021, 2021    FLU VAC QIV SPLIT 3 YRS AND OLDER (77830) 2016    Influenza 10/14/2011    TDAP 2024    Zoster Vaccine Recombinant Adjuvanted (Shingrix) 2024, 2024   Deferred Date(s) Deferred    FLUZONE 6 months and older PFS 0.5 ml (09547) 2023       ALLERGIES AND DRUG REACTIONS  Allergies[2]    Family History   Problem Relation Age of Onset    Breast Cancer Maternal Grandmother 60        DX in her 60's     Cancer Maternal Grandmother     Dementia Maternal Grandmother     Cancer Paternal Grandmother         Lung and bone    Breast Cancer Other         age at dx:  unknown    Breast Cancer Other         age at dx:   unknown    Depression Mother     Diabetes Mother     Hypertension Mother     Cancer Father     Hypertension Father     Obesity Father     Depression Son      Social History     Socioeconomic History    Marital  status:    Tobacco Use    Smoking status: Never    Smokeless tobacco: Never   Substance and Sexual Activity    Alcohol use: No    Drug use: No   Other Topics Concern    Caffeine Concern Yes    Exercise No    Seat Belt Yes    Special Diet No    Stress Concern Yes    Weight Concern Yes       Review of Systems   Musculoskeletal:  Positive for arthralgias.   All other systems reviewed and are negative.         Objective:      There were no vitals taken for this visit.  There is no height or weight on file to calculate BMI.    Physical Exam  Constitutional:       General: She is not in acute distress.     Appearance: She is not ill-appearing or toxic-appearing.   Pulmonary:      Effort: Pulmonary effort is normal.   Musculoskeletal:      Cervical back: Neck supple.   Neurological:      Mental Status: She is alert and oriented to person, place, and time.            Assessment and Plan:      1. Class 3 severe obesity due to excess calories without serious comorbidity with body mass index (BMI) of 50.0 to 59.9 in adult ([facility]) (Primary)  -     Zepbound; Inject 2.5 mg into the skin once a week for 4 doses.  Dispense: 2 mL; Refill: 0  -     Zepbound; Inject 5 mg into the skin once a week for 4 doses. Start this prescription after finishing your Zepbound 2.5 mg prescription  Dispense: 2 mL; Refill: 0  -     Zepbound; Inject 7.5 mg into the skin once a week for 4 doses. Start this prescription after finishing your Zepbound 5 mg prescription  Dispense: 2 mL; Refill: 0  2. Body mass index 50.0-59.9, adult (HCC)  -     Zepbound; Inject 2.5 mg into the skin once a week for 4 doses.  Dispense: 2 mL; Refill: 0  -     Zepbound; Inject 5 mg into the skin once a week for 4 doses. Start this prescription after finishing your Zepbound 2.5 mg prescription  Dispense: 2 mL; Refill: 0  -     Zepbound; Inject 7.5 mg into the skin once a week for 4 doses. Start this prescription after finishing your Zepbound 5 mg prescription  Dispense: 2  mL; Refill: 0  3. Pure hypercholesterolemia  -     Zepbound; Inject 2.5 mg into the skin once a week for 4 doses.  Dispense: 2 mL; Refill: 0  -     Zepbound; Inject 5 mg into the skin once a week for 4 doses. Start this prescription after finishing your Zepbound 2.5 mg prescription  Dispense: 2 mL; Refill: 0  -     Zepbound; Inject 7.5 mg into the skin once a week for 4 doses. Start this prescription after finishing your Zepbound 5 mg prescription  Dispense: 2 mL; Refill: 0  4. Prediabetes  -     Zepbound; Inject 2.5 mg into the skin once a week for 4 doses.  Dispense: 2 mL; Refill: 0  -     Zepbound; Inject 5 mg into the skin once a week for 4 doses. Start this prescription after finishing your Zepbound 2.5 mg prescription  Dispense: 2 mL; Refill: 0  -     Zepbound; Inject 7.5 mg into the skin once a week for 4 doses. Start this prescription after finishing your Zepbound 5 mg prescription  Dispense: 2 mL; Refill: 0  5. Left hip pain  -     XR HIP + PELVIS MIN 4 VIEWS LEFT (CPT=73503); Future; Expected date: 01/02/2025  -     ORTHOPEDIC - INTERNAL    Return in about 3 months (around 4/2/2025) for physical.    BMI>50  - she was doing well with phentermine, but her progress has reached a plateau  - recommended discontinuation of phentermine  - a shared decision was made to start zepbound instead. We discussed the R/B/A of this medication and proper administration technique  - she was asked to follow-up in about 3 months for her annual physical and weight check    Chronic hip pain  - possible arthritis  - recommended consultation with Dr. Maloney for further evaluation and treatment. We discussed the option to obtain an x-ray prior to her Ortho appointment   - continue diclofenac in the interim    Patient verbalized understanding of assessment and recommendations. All questions and concerns were addressed.    Telehealth appointment with video and audio was scheduled and completed with the patient's informed consent.  Telehealth appointment took place in context of [facility] social distancing guidelines during the Covid-19 pandemic.      Electronically signed by Tomasz Ren MD         [1]   Outpatient Medications Marked as Taking for the 1/2/25 encounter (Telemedicine) with Tomasz Ren MD   Medication Sig Dispense Refill    Tirzepatide-Weight Management (ZEPBOUND) 2.5 MG/0.5ML Subcutaneous Solution Auto-injector Inject 2.5 mg into the skin once a week for 4 doses. 2 mL 0    Tirzepatide-Weight Management (ZEPBOUND) 5 MG/0.5ML Subcutaneous Solution Auto-injector Inject 5 mg into the skin once a week for 4 doses. Start this prescription after finishing your Zepbound 2.5 mg prescription 2 mL 0    Tirzepatide-Weight Management (ZEPBOUND) 7.5 MG/0.5ML Subcutaneous Solution Auto-injector Inject 7.5 mg into the skin once a week for 4 doses. Start this prescription after finishing your Zepbound 5 mg prescription 2 mL 0    diclofenac 75 MG Oral Tab EC Take 1 tablet (75 mg total) by mouth 2 (two) times daily as needed. 180 tablet 1    AMLODIPINE 5 MG Oral Tab TAKE ONE TABLET BY MOUTH TWICE DAILY 180 tablet 0   [2]   Allergies  Allergen Reactions    Adhesive Tape     Neosporin [Neomycin-Bacitracin-Polymyxin]

## 2025-01-03 NOTE — TELEPHONE ENCOUNTER
PA submitted via CM.  Key: [id number]  All questions answered.  Awaiting on response.    MCM sent to patient.

## 2025-03-26 DIAGNOSIS — I10 PRIMARY HYPERTENSION: ICD-10-CM

## 2025-03-26 RX ORDER — AMLODIPINE BESYLATE 5 MG/1
5 TABLET ORAL 2 TIMES DAILY
Qty: 180 TABLET | Refills: 0 | Status: SHIPPED | OUTPATIENT
Start: 2025-03-26

## 2025-03-26 NOTE — TELEPHONE ENCOUNTER
Name from pharmacy: Amlodipine Besylate 5 Mg Tab Unic         Will file in chart as: AMLODIPINE 5 MG Oral Tab    Sig: TAKE ONE TABLET BY MOUTH TWICE DAILY    Disp: 180 tablet    Refills: 0    Start: 3/26/2025    Class: Normal    Non-formulary For: Primary hypertension    Last ordered: 6 months ago (9/11/2024) by Tomasz Ren MD    Last refill: 1/25/2025    Rx #: [id number]    Hypertension Medications Protocol [id number]/26/2025 07:25 AM   Protocol Details CMP or BMP in past 12 months    Last BP reading less than 140/90    In person appointment or virtual visit in the past 12 mos or appointment in next 3 mos    EGFRCR or GFRNAA > 50    Medication is active on med list      To be filled at: [id number] - [place], [place] - [address] [phone], [phone]

## 2025-04-10 DIAGNOSIS — E78.00 PURE HYPERCHOLESTEROLEMIA: ICD-10-CM

## 2025-04-10 DIAGNOSIS — E66.813 CLASS 3 SEVERE OBESITY DUE TO EXCESS CALORIES WITHOUT SERIOUS COMORBIDITY WITH BODY MASS INDEX (BMI) OF 50.0 TO 59.9 IN ADULT (HCC): ICD-10-CM

## 2025-04-10 DIAGNOSIS — E66.01 CLASS 3 SEVERE OBESITY DUE TO EXCESS CALORIES WITHOUT SERIOUS COMORBIDITY WITH BODY MASS INDEX (BMI) OF 50.0 TO 59.9 IN ADULT (HCC): ICD-10-CM

## 2025-04-10 DIAGNOSIS — R73.03 PREDIABETES: ICD-10-CM

## 2025-04-10 RX ORDER — PHENTERMINE HYDROCHLORIDE 37.5 MG/1
37.5 CAPSULE ORAL EVERY MORNING
Qty: 30 CAPSULE | Refills: 0 | Status: SHIPPED | OUTPATIENT
Start: 2025-04-10

## 2025-04-10 NOTE — TELEPHONE ENCOUNTER
Requesting Phentermine 37.5mg  Last OV: 1/2/25 Telemedicine  RTC: not noted  Last Rx'd 11/17/24 #30 with 0 refills    Future Appointments   Date Time Provider Department Center   4/28/2025  5:00 PM Tomasz Ren MD [facility] [address] [facility] [address]       Per [place] , last dispensed 11/17/24 #30    Patient comment: I have not taken this since December.  With the denial of zepbound by insurance, would like to go back to this.     Ok to refill?

## 2025-04-28 ENCOUNTER — OFFICE VISIT (OUTPATIENT)
Dept: FAMILY MEDICINE CLINIC | Facility: CLINIC | Age: 50
End: 2025-04-28
Payer: COMMERCIAL

## 2025-04-28 VITALS
SYSTOLIC BLOOD PRESSURE: 130 MMHG | BODY MASS INDEX: 47.71 KG/M2 | RESPIRATION RATE: 16 BRPM | OXYGEN SATURATION: 99 % | HEIGHT: 62 IN | WEIGHT: 259.25 LBS | TEMPERATURE: 97 F | DIASTOLIC BLOOD PRESSURE: 78 MMHG | HEART RATE: 84 BPM

## 2025-04-28 DIAGNOSIS — E66.813 CLASS 3 SEVERE OBESITY DUE TO EXCESS CALORIES WITHOUT SERIOUS COMORBIDITY WITH BODY MASS INDEX (BMI) OF 50.0 TO 59.9 IN ADULT: ICD-10-CM

## 2025-04-28 DIAGNOSIS — E78.00 PURE HYPERCHOLESTEROLEMIA: ICD-10-CM

## 2025-04-28 DIAGNOSIS — Z13.220 SCREENING CHOLESTEROL LEVEL: ICD-10-CM

## 2025-04-28 DIAGNOSIS — Z00.00 WELL ADULT EXAM: Primary | ICD-10-CM

## 2025-04-28 DIAGNOSIS — D12.6 ADENOMATOUS POLYP OF COLON, UNSPECIFIED PART OF COLON: ICD-10-CM

## 2025-04-28 DIAGNOSIS — R73.03 PREDIABETES: ICD-10-CM

## 2025-04-28 DIAGNOSIS — Z13.31 NEGATIVE DEPRESSION SCREENING: ICD-10-CM

## 2025-04-28 DIAGNOSIS — Z12.31 BREAST CANCER SCREENING BY MAMMOGRAM: ICD-10-CM

## 2025-04-28 PROCEDURE — 99396 PREV VISIT EST AGE 40-64: CPT | Performed by: STUDENT IN AN ORGANIZED HEALTH CARE EDUCATION/TRAINING PROGRAM

## 2025-04-28 RX ORDER — PHENTERMINE HYDROCHLORIDE 37.5 MG/1
37.5 TABLET ORAL
Qty: 30 TABLET | Refills: 0 | Status: SHIPPED | OUTPATIENT
Start: 2025-05-10 | End: 2025-06-09

## 2025-04-28 RX ORDER — PHENTERMINE HYDROCHLORIDE 37.5 MG/1
37.5 TABLET ORAL
Qty: 30 TABLET | Refills: 0 | Status: SHIPPED | OUTPATIENT
Start: 2025-07-08 | End: 2025-08-07

## 2025-04-28 RX ORDER — PHENTERMINE HYDROCHLORIDE 37.5 MG/1
37.5 TABLET ORAL
Qty: 30 TABLET | Refills: 0 | Status: SHIPPED | OUTPATIENT
Start: 2025-06-09 | End: 2025-07-09

## 2025-04-28 NOTE — PROGRESS NOTES
Subjective:      Chief Complaint   Patient presents with    Physical    Weight Check     HISTORY OF PRESENT ILLNESS  HPI  HPI obtained per patient report.  Johanny Lin is a pleasant 50 year old female presenting for her annual physical and follow-up on her weight loss management plan.   She has resumed phentermine due to insurance non-coverage for zepbound. She feels that resumption of phentermine has been helping with appetite modulation for her.     PAST PATIENT HISTORY  Past Medical History[1]  Past Surgical History[2]    CURRENT MEDICATIONS  Medications Taking[3]    HEALTH MAINTENANCE  Immunization History   Administered Date(s) Administered    >=3 YRS TRI  MULTIDOSE VIAL (07563) FLU CLINIC 01/16/2014    Covid-19 Vaccine Pfizer 30 mcg/0.3 ml 05/06/2021, 05/27/2021    FLU VAC QIV SPLIT 3 YRS AND OLDER (27066) 12/19/2016    Influenza 10/14/2011    TDAP 02/19/2024    Zoster Vaccine Recombinant Adjuvanted (Shingrix) 02/19/2024, 05/13/2024   Deferred Date(s) Deferred    FLUZONE 6 months and older PFS 0.5 ml (45555) 12/19/2023       ALLERGIES AND DRUG REACTIONS  Allergies[4]    Family History[5]  Short Social Hx on File[6]    Review of Systems   All other systems reviewed and are negative.         Objective:      /78   Pulse 84   Temp 97.2 °F (36.2 °C) (Temporal)   Resp 16   Ht 5' 2\" (1.575 m)   Wt 259 lb 4 oz (117.6 kg)   SpO2 99%   BMI 47.42 kg/m²   Body mass index is 47.42 kg/m².    Physical Exam  Vitals reviewed.   Constitutional:       General: She is not in acute distress.     Appearance: She is not ill-appearing, toxic-appearing or diaphoretic.   HENT:      Head: Normocephalic and atraumatic.   Eyes:      General: No scleral icterus.        Right eye: No discharge.         Left eye: No discharge.      Extraocular Movements: Extraocular movements intact.      Conjunctiva/sclera: Conjunctivae normal.      Pupils: Pupils are equal, round, and reactive to light.   Neck:      Thyroid: No  thyroid mass, thyromegaly or thyroid tenderness.   Cardiovascular:      Rate and Rhythm: Normal rate and regular rhythm.      Heart sounds: Normal heart sounds.   Pulmonary:      Effort: Pulmonary effort is normal.      Breath sounds: Normal breath sounds.   Abdominal:      General: Bowel sounds are normal. There is no distension.      Palpations: Abdomen is soft. There is no mass.      Tenderness: There is no abdominal tenderness. There is no guarding or rebound.   Musculoskeletal:      Cervical back: Neck supple. No tenderness.      Right lower leg: No edema.      Left lower leg: No edema.   Lymphadenopathy:      Cervical: No cervical adenopathy.   Neurological:      Mental Status: She is alert and oriented to person, place, and time.   Psychiatric:         Mood and Affect: Mood normal.            Assessment and Plan:      1. Well adult exam (Primary)  -     Evino FADI 2D+3D SCREENING BILAT (CPT=77067/53343); Future; Expected date: 04/28/2025  -     CBC With Differential With Platelet  -     Comp Metabolic Panel (14)  -     TSH W Reflex To Free T4  -     Lipid Panel  -     Hemoglobin A1C  2. Screening cholesterol level  -     Lipid Panel  3. Breast cancer screening by mammogram  -     Evino FADI 2D+3D SCREENING BILAT (CPT=77067/01534); Future; Expected date: 04/28/2025  4. Adenomatous polyp of colon, unspecified part of colon  5. Negative depression screening  6. Class 3 severe obesity due to excess calories without serious comorbidity with body mass index (BMI) of 50.0 to 59.9 in adult  -     Phentermine HCl; Take 1 tablet (37.5 mg total) by mouth every morning before breakfast.  Dispense: 30 tablet; Refill: 0  -     Phentermine HCl; Take 1 tablet (37.5 mg total) by mouth every morning before breakfast.  Dispense: 30 tablet; Refill: 0  -     Phentermine HCl; Take 1 tablet (37.5 mg total) by mouth every morning before breakfast.  Dispense: 30 tablet; Refill: 0  7. Body mass index 50.0-59.9, adult (HCC)  -      Phentermine HCl; Take 1 tablet (37.5 mg total) by mouth every morning before breakfast.  Dispense: 30 tablet; Refill: 0  -     Phentermine HCl; Take 1 tablet (37.5 mg total) by mouth every morning before breakfast.  Dispense: 30 tablet; Refill: 0  -     Phentermine HCl; Take 1 tablet (37.5 mg total) by mouth every morning before breakfast.  Dispense: 30 tablet; Refill: 0  8. Prediabetes  -     Phentermine HCl; Take 1 tablet (37.5 mg total) by mouth every morning before breakfast.  Dispense: 30 tablet; Refill: 0  -     Phentermine HCl; Take 1 tablet (37.5 mg total) by mouth every morning before breakfast.  Dispense: 30 tablet; Refill: 0  -     Phentermine HCl; Take 1 tablet (37.5 mg total) by mouth every morning before breakfast.  Dispense: 30 tablet; Refill: 0  9. Pure hypercholesterolemia  -     Phentermine HCl; Take 1 tablet (37.5 mg total) by mouth every morning before breakfast.  Dispense: 30 tablet; Refill: 0  -     Phentermine HCl; Take 1 tablet (37.5 mg total) by mouth every morning before breakfast.  Dispense: 30 tablet; Refill: 0  -     Phentermine HCl; Take 1 tablet (37.5 mg total) by mouth every morning before breakfast.  Dispense: 30 tablet; Refill: 0    Return in about 3 months (around 7/28/2025) for follow-up.    Physical  - annual lab and screening mammogram orders were provided for her today  - she is UTD on her screening pap smear. Next due 5/2029  - she is due for a follow-up colonoscopy due to history of adenomatous polyps. She prefers to follow-up with Dr. Jang in [place] for this procedure and will contact his office  - she is UTD on her immunizations      BMI>50, prediabetes, hypercholesterolemia  - tolerating phentermine 37.5 mg well, and this medication is helping with appetite modulation  - her prescriptions were renewed for another 3 months for her  - she was asked to follow-up every 3 months, including at least 1 in-person visit every 6 months per [facility] policy     Patient  verbalized understanding of assessment and recommendations. All questions and concerns were addressed.    Electronically signed by Tomasz Ren MD         [1]   Past Medical History:   Esophageal reflux    HTN (hypertension)    Obesity    Prediabetes   [2]   Past Surgical History:  Procedure Laterality Date          Cholecystectomy  2022    Colonoscopy  2022      01   [3]   Outpatient Medications Marked as Taking for the 25 encounter (Office Visit) with Tomasz Ren MD   Medication Sig Dispense Refill    [START ON 5/10/2025] Phentermine HCl 37.5 MG Oral Tab Take 1 tablet (37.5 mg total) by mouth every morning before breakfast. 30 tablet 0    [START ON 2025] Phentermine HCl 37.5 MG Oral Tab Take 1 tablet (37.5 mg total) by mouth every morning before breakfast. 30 tablet 0    [START ON 2025] Phentermine HCl 37.5 MG Oral Tab Take 1 tablet (37.5 mg total) by mouth every morning before breakfast. 30 tablet 0    Phentermine HCl 37.5 MG Oral Cap Take 1 capsule (37.5 mg total) by mouth every morning. 30 capsule 0    AMLODIPINE 5 MG Oral Tab TAKE ONE TABLET BY MOUTH TWICE DAILY 180 tablet 0    diclofenac 75 MG Oral Tab EC Take 1 tablet (75 mg total) by mouth 2 (two) times daily as needed. 180 tablet 1    Omeprazole 40 MG Oral Capsule Delayed Release      [4]   Allergies  Allergen Reactions    Adhesive Tape     Neosporin [Neomycin-Bacitracin-Polymyxin]    [5]   Family History  Problem Relation Age of Onset    Breast Cancer Maternal Grandmother 60        DX in her 60's     Cancer Maternal Grandmother         Breast cancer    Dementia Maternal Grandmother     Cancer Paternal Grandmother         Lung cancer    Breast Cancer Other         age at dx:  unknown    Breast Cancer Other         age at dx:   unknown    Depression Mother     Diabetes Mother     Hypertension Mother     Cancer Father         Esophageal cancer    Hypertension Father     Obesity Father     Depression Son     Cancer  Maternal Grandfather         Lung cancer    Cancer Maternal Uncle         Colon cancer    Anemia Daughter    [6]   Social History  Socioeconomic History    Marital status:    Tobacco Use    Smoking status: Never    Smokeless tobacco: Never   Substance and Sexual Activity    Alcohol use: No    Drug use: No   Other Topics Concern    Caffeine Concern No    Exercise No    Seat Belt No    Special Diet No    Stress Concern No    Weight Concern Yes     Comment: Need to lose weight

## 2025-07-15 ENCOUNTER — TELEPHONE (OUTPATIENT)
Dept: FAMILY MEDICINE CLINIC | Facility: CLINIC | Age: 50
End: 2025-07-15

## 2025-07-15 NOTE — TELEPHONE ENCOUNTER
Patient is currently taking Phentermine 37.5mg.    [place]     Dispensed Written Strength Quantity Refills Days Supply Provider Pharmacy   PHENTERMINE 37.5 MG TAB KVKT 07/12/2025 04/28/2025 37.5 30 each  30 Tomasz Ren MD [facility]

## 2025-07-15 NOTE — TELEPHONE ENCOUNTER
Received PA paperwork for  Zepbound 7.5mg/0.5ml.  the paperwork was placed in the MA's folder.    Key# [id number]

## 2025-07-23 ENCOUNTER — LAB ENCOUNTER (OUTPATIENT)
Dept: LAB | Age: 50
End: 2025-07-23
Attending: STUDENT IN AN ORGANIZED HEALTH CARE EDUCATION/TRAINING PROGRAM
Payer: COMMERCIAL

## 2025-07-23 ENCOUNTER — HOSPITAL ENCOUNTER (OUTPATIENT)
Dept: GENERAL RADIOLOGY | Age: 50
Discharge: HOME | End: 2025-07-23
Attending: STUDENT IN AN ORGANIZED HEALTH CARE EDUCATION/TRAINING PROGRAM
Payer: COMMERCIAL

## 2025-07-23 DIAGNOSIS — M25.552 LEFT HIP PAIN: ICD-10-CM

## 2025-07-23 LAB
ALBUMIN SERPL-MCNC: 4.3 G/DL (ref 3.2–4.8)
ALBUMIN/GLOB SERPL: 1.7 {RATIO} (ref 1–2)
ALP LIVER SERPL-CCNC: 95 U/L (ref 41–108)
ALT SERPL-CCNC: 62 U/L (ref 10–49)
ANION GAP SERPL CALC-SCNC: 7 MMOL/L (ref 0–18)
AST SERPL-CCNC: 45 U/L (ref ?–34)
BASOPHILS # BLD AUTO: 0.05 X10(3) UL (ref 0–0.2)
BASOPHILS NFR BLD AUTO: 0.7 %
BILIRUB SERPL-MCNC: 0.6 MG/DL (ref 0.3–1.2)
BUN BLD-MCNC: 16 MG/DL (ref 9–23)
CALCIUM BLD-MCNC: 9.4 MG/DL (ref 8.7–10.6)
CHLORIDE SERPL-SCNC: 106 MMOL/L (ref 98–112)
CHOLEST SERPL-MCNC: 196 MG/DL (ref ?–200)
CO2 SERPL-SCNC: 28 MMOL/L (ref 21–32)
CREAT BLD-MCNC: 1.03 MG/DL (ref 0.55–1.02)
EGFRCR SERPLBLD CKD-EPI 2021: 65 ML/MIN/1.73M2 (ref 60–?)
EOSINOPHIL # BLD AUTO: 0.23 X10(3) UL (ref 0–0.7)
EOSINOPHIL NFR BLD AUTO: 3.4 %
ERYTHROCYTE [DISTWIDTH] IN BLOOD BY AUTOMATED COUNT: 13.3 %
EST. AVERAGE GLUCOSE BLD GHB EST-MCNC: 114 MG/DL (ref 68–126)
FASTING PATIENT LIPID ANSWER: YES
FASTING STATUS PATIENT QL REPORTED: YES
GLOBULIN PLAS-MCNC: 2.6 G/DL (ref 2–3.5)
GLUCOSE BLD-MCNC: 127 MG/DL (ref 70–99)
HBA1C MFR BLD: 5.6 % (ref ?–5.7)
HCT VFR BLD AUTO: 41.9 % (ref 35–48)
HDLC SERPL-MCNC: 54 MG/DL (ref 40–59)
HGB BLD-MCNC: 13.9 G/DL (ref 12–16)
IMM GRANULOCYTES # BLD AUTO: 0.01 X10(3) UL (ref 0–1)
IMM GRANULOCYTES NFR BLD: 0.1 %
LDLC SERPL CALC-MCNC: 107 MG/DL (ref ?–100)
LYMPHOCYTES # BLD AUTO: 2.55 X10(3) UL (ref 1–4)
LYMPHOCYTES NFR BLD AUTO: 37.2 %
MCH RBC QN AUTO: 30 PG (ref 26–34)
MCHC RBC AUTO-ENTMCNC: 33.2 G/DL (ref 31–37)
MCV RBC AUTO: 90.3 FL (ref 80–100)
MONOCYTES # BLD AUTO: 0.46 X10(3) UL (ref 0.1–1)
MONOCYTES NFR BLD AUTO: 6.7 %
NEUTROPHILS # BLD AUTO: 3.56 X10 (3) UL (ref 1.5–7.7)
NEUTROPHILS # BLD AUTO: 3.56 X10(3) UL (ref 1.5–7.7)
NEUTROPHILS NFR BLD AUTO: 51.9 %
NONHDLC SERPL-MCNC: 142 MG/DL (ref ?–130)
OSMOLALITY SERPL CALC.SUM OF ELEC: 295 MOSM/KG (ref 275–295)
PLATELET # BLD AUTO: 277 10(3)UL (ref 150–450)
POTASSIUM SERPL-SCNC: 4.1 MMOL/L (ref 3.5–5.1)
PROT SERPL-MCNC: 6.9 G/DL (ref 5.7–8.2)
RBC # BLD AUTO: 4.64 X10(6)UL (ref 3.8–5.3)
SODIUM SERPL-SCNC: 141 MMOL/L (ref 136–145)
TRIGL SERPL-MCNC: 203 MG/DL (ref 30–149)
TSI SER-ACNC: 3.31 UIU/ML (ref 0.55–4.78)
VLDLC SERPL CALC-MCNC: 35 MG/DL (ref 0–30)
WBC # BLD AUTO: 6.9 X10(3) UL (ref 4–11)

## 2025-07-23 PROCEDURE — 85025 COMPLETE CBC W/AUTO DIFF WBC: CPT | Performed by: STUDENT IN AN ORGANIZED HEALTH CARE EDUCATION/TRAINING PROGRAM

## 2025-07-23 PROCEDURE — 80053 COMPREHEN METABOLIC PANEL: CPT | Performed by: STUDENT IN AN ORGANIZED HEALTH CARE EDUCATION/TRAINING PROGRAM

## 2025-07-23 PROCEDURE — 36415 COLL VENOUS BLD VENIPUNCTURE: CPT | Performed by: STUDENT IN AN ORGANIZED HEALTH CARE EDUCATION/TRAINING PROGRAM

## 2025-07-23 PROCEDURE — 80061 LIPID PANEL: CPT | Performed by: STUDENT IN AN ORGANIZED HEALTH CARE EDUCATION/TRAINING PROGRAM

## 2025-07-23 PROCEDURE — 84443 ASSAY THYROID STIM HORMONE: CPT | Performed by: STUDENT IN AN ORGANIZED HEALTH CARE EDUCATION/TRAINING PROGRAM

## 2025-07-23 PROCEDURE — 73502 X-RAY EXAM HIP UNI 2-3 VIEWS: CPT | Performed by: STUDENT IN AN ORGANIZED HEALTH CARE EDUCATION/TRAINING PROGRAM

## 2025-07-23 PROCEDURE — 83036 HEMOGLOBIN GLYCOSYLATED A1C: CPT | Performed by: STUDENT IN AN ORGANIZED HEALTH CARE EDUCATION/TRAINING PROGRAM

## 2025-07-28 ENCOUNTER — TELEMEDICINE (OUTPATIENT)
Dept: FAMILY MEDICINE CLINIC | Facility: CLINIC | Age: 50
End: 2025-07-28
Payer: COMMERCIAL

## 2025-07-28 DIAGNOSIS — R73.03 PREDIABETES: ICD-10-CM

## 2025-07-28 DIAGNOSIS — E66.813 CLASS 3 SEVERE OBESITY DUE TO EXCESS CALORIES WITHOUT SERIOUS COMORBIDITY WITH BODY MASS INDEX (BMI) OF 50.0 TO 59.9 IN ADULT: ICD-10-CM

## 2025-07-28 DIAGNOSIS — M16.12 ARTHRITIS OF LEFT HIP: Primary | ICD-10-CM

## 2025-07-28 DIAGNOSIS — E78.00 PURE HYPERCHOLESTEROLEMIA: ICD-10-CM

## 2025-07-28 DIAGNOSIS — R79.89 ELEVATED SERUM CREATININE: ICD-10-CM

## 2025-07-28 RX ORDER — MELOXICAM 7.5 MG/1
7.5 TABLET ORAL
Qty: 30 TABLET | Refills: 1 | Status: SHIPPED | OUTPATIENT
Start: 2025-07-28

## 2025-07-28 RX ORDER — PHENTERMINE HYDROCHLORIDE 37.5 MG/1
37.5 CAPSULE ORAL EVERY MORNING
Qty: 30 CAPSULE | Refills: 0 | Status: SHIPPED | OUTPATIENT
Start: 2025-10-11 | End: 2025-11-10

## 2025-07-28 RX ORDER — PHENTERMINE HYDROCHLORIDE 37.5 MG/1
37.5 CAPSULE ORAL EVERY MORNING
Qty: 30 CAPSULE | Refills: 0 | Status: SHIPPED | OUTPATIENT
Start: 2025-09-11 | End: 2025-10-11

## 2025-07-28 RX ORDER — PHENTERMINE HYDROCHLORIDE 37.5 MG/1
37.5 CAPSULE ORAL EVERY MORNING
Qty: 30 CAPSULE | Refills: 0 | Status: SHIPPED | OUTPATIENT
Start: 2025-08-12 | End: 2025-09-11

## 2025-07-28 NOTE — PROGRESS NOTES
Subjective:      No chief complaint on file.    HISTORY OF PRESENT ILLNESS  HPI  HPI obtained per patient report.  Johanny Lin is a pleasant 50 year old female presenting virtually for follow-up.   Her weight has been stable since her LOV. She has been adhering to healthy dietary modifications and exercising regularly as tolerated with her L hip pain.       PAST PATIENT HISTORY  Past Medical History[1]  Past Surgical History[2]    CURRENT MEDICATIONS  Medications Taking[3]    HEALTH MAINTENANCE  Immunization History   Administered Date(s) Administered    >=3 YRS TRI  MULTIDOSE VIAL (10614) FLU CLINIC 01/16/2014    Covid-19 Vaccine Pfizer 30 mcg/0.3 ml 05/06/2021, 05/27/2021    FLU VAC QIV SPLIT 3 YRS AND OLDER (56117) 12/19/2016    Influenza 10/14/2011    TDAP 02/19/2024    Zoster Vaccine Recombinant Adjuvanted (Shingrix) 02/19/2024, 05/13/2024   Deferred Date(s) Deferred    FLUZONE 6 months and older PFS 0.5 ml (63111) 12/19/2023       ALLERGIES AND DRUG REACTIONS  Allergies[4]    Family History[5]  Short Social Hx on File[6]    Review of Systems   Musculoskeletal:  Positive for arthralgias.   All other systems reviewed and are negative.         Objective:      There were no vitals taken for this visit.  There is no height or weight on file to calculate BMI.    Physical Exam  Constitutional:       General: She is not in acute distress.     Appearance: She is not ill-appearing or toxic-appearing.   Pulmonary:      Effort: Pulmonary effort is normal.   Musculoskeletal:      Cervical back: Neck supple.   Neurological:      Mental Status: She is alert and oriented to person, place, and time.   Psychiatric:         Mood and Affect: Mood normal.            Assessment and Plan:      1. Arthritis of left hip (Primary)  -     Meloxicam; Take 1 tablet (7.5 mg total) by mouth daily as needed for Pain.  Dispense: 30 tablet; Refill: 1  2. Elevated serum creatinine  -     Basic Metabolic Panel (8)  3. Class 3 severe  obesity due to excess calories without serious comorbidity with body mass index (BMI) of 50.0 to 59.9 in adult  4. Body mass index 50.0-59.9, adult (HCC)  5. Prediabetes  6. Pure hypercholesterolemia    Return in about 3 months (around 10/28/2025) for follow-up.    L hip arthritis  - x-ray 7/23/25 reviewed  - recommended discontinuation of diclofenac, as it has not been helping significantly with her pain   - recommended trying meloxicam instead  - she declines PT or steroid injections at this time  - we discussed that weight loss would likely help improve her hip pain as well    Elevated creatinine  - borderline elevated on 7/23/25  - she notes that she does not drink enough water   - recommended at least 64 oz water intake per day and rechecking BMP in 1 month  - if persistently elevated, we will need to take a break from NSAID medications     BMI>50, prediabetes, hypercholesterolemia  - tolerating phentermine 37.5 mg well, and this medication is helping with appetite modulation  - lab results 7/23/25 showed improvement of her cholesterol and normalization of her A1C. Her triglycerides were elevated, but likely 2/2 not being fasting at the time of the draw  - she is experiencing plateau effects on this dose, however  - she was unable to start other medication options due to insurance non-coverage  - she does not wish to pursue bariatric surgery at this time  - a shared decision was made to continue her current phentermine regimen for maintenance  - her prescriptions were renewed for another 3 months for her        Patient verbalized understanding of assessment and recommendations. All questions and concerns were addressed.  Telehealth appointment with video and audio was scheduled and completed with the patient's informed consent. Telehealth appointment took place in context of CDC social distancing guidelines during the Covid-19 pandemic.    Electronically signed by Tomasz Ren MD       [1]   Past Medical  History:   Esophageal reflux    HTN (hypertension)    Obesity    Prediabetes   [2]   Past Surgical History:  Procedure Laterality Date          Cholecystectomy  2022    Colonoscopy  2022      01   [3]   Outpatient Medications Marked as Taking for the 25 encounter (Telemedicine) with Tomasz Ren MD   Medication Sig Dispense Refill    Meloxicam 7.5 MG Oral Tab Take 1 tablet (7.5 mg total) by mouth daily as needed for Pain. 30 tablet 1   [4]   Allergies  Allergen Reactions    Adhesive Tape     Neosporin [Neomycin-Bacitracin-Polymyxin]    [5]   Family History  Problem Relation Age of Onset    Breast Cancer Maternal Grandmother 60        DX in her 60's     Cancer Maternal Grandmother         Breast cancer    Dementia Maternal Grandmother     Cancer Paternal Grandmother         Lung cancer    Breast Cancer Other         age at dx:  unknown    Breast Cancer Other         age at dx:   unknown    Depression Mother     Diabetes Mother     Hypertension Mother     Cancer Father         Esophageal cancer    Hypertension Father     Obesity Father     Depression Son     Cancer Maternal Grandfather         Lung cancer    Cancer Maternal Uncle         Colon cancer    Anemia Daughter    [6]   Social History  Socioeconomic History    Marital status:    Tobacco Use    Smoking status: Never    Smokeless tobacco: Never   Substance and Sexual Activity    Alcohol use: No    Drug use: No   Other Topics Concern    Caffeine Concern No    Exercise No    Seat Belt No    Special Diet No    Stress Concern No    Weight Concern Yes     Comment: Need to lose weight